# Patient Record
Sex: MALE | Race: WHITE | NOT HISPANIC OR LATINO | Employment: UNEMPLOYED | URBAN - NONMETROPOLITAN AREA
[De-identification: names, ages, dates, MRNs, and addresses within clinical notes are randomized per-mention and may not be internally consistent; named-entity substitution may affect disease eponyms.]

---

## 2024-01-24 ENCOUNTER — HOSPITAL ENCOUNTER (EMERGENCY)
Facility: HOSPITAL | Age: 34
Discharge: STILL ADMITTED OR ADMINISTRATIVE | DRG: 885 | End: 2024-01-24
Attending: STUDENT IN AN ORGANIZED HEALTH CARE EDUCATION/TRAINING PROGRAM
Payer: MEDICAID

## 2024-01-24 ENCOUNTER — HOSPITAL ENCOUNTER (INPATIENT)
Facility: HOSPITAL | Age: 34
LOS: 6 days | Discharge: HOME | DRG: 885 | End: 2024-01-30
Attending: PSYCHIATRY & NEUROLOGY | Admitting: PSYCHIATRY & NEUROLOGY
Payer: MEDICAID

## 2024-01-24 ENCOUNTER — APPOINTMENT (OUTPATIENT)
Dept: GENERAL RADIOLOGY | Facility: HOSPITAL | Age: 34
DRG: 885 | End: 2024-01-24
Payer: MEDICAID

## 2024-01-24 ENCOUNTER — APPOINTMENT (OUTPATIENT)
Dept: CT IMAGING | Facility: HOSPITAL | Age: 34
DRG: 885 | End: 2024-01-24
Payer: MEDICAID

## 2024-01-24 VITALS
SYSTOLIC BLOOD PRESSURE: 105 MMHG | WEIGHT: 210 LBS | DIASTOLIC BLOOD PRESSURE: 65 MMHG | OXYGEN SATURATION: 99 % | BODY MASS INDEX: 27.83 KG/M2 | HEIGHT: 73 IN | TEMPERATURE: 98.4 F | RESPIRATION RATE: 18 BRPM | HEART RATE: 56 BPM

## 2024-01-24 DIAGNOSIS — F29 PSYCHOSIS, UNSPECIFIED PSYCHOSIS TYPE: Primary | ICD-10-CM

## 2024-01-24 LAB
ALBUMIN SERPL-MCNC: 4.7 G/DL (ref 3.5–5.2)
ALBUMIN/GLOB SERPL: 1.3 G/DL
ALP SERPL-CCNC: 89 U/L (ref 39–117)
ALT SERPL W P-5'-P-CCNC: 23 U/L (ref 1–41)
AMPHET+METHAMPHET UR QL: NEGATIVE
AMPHETAMINES UR QL: NEGATIVE
ANION GAP SERPL CALCULATED.3IONS-SCNC: 9.6 MMOL/L (ref 5–15)
AST SERPL-CCNC: 23 U/L (ref 1–40)
BARBITURATES UR QL SCN: NEGATIVE
BASOPHILS # BLD AUTO: 0.05 10*3/MM3 (ref 0–0.2)
BASOPHILS NFR BLD AUTO: 0.5 % (ref 0–1.5)
BENZODIAZ UR QL SCN: NEGATIVE
BILIRUB SERPL-MCNC: 0.5 MG/DL (ref 0–1.2)
BILIRUB UR QL STRIP: NEGATIVE
BUN SERPL-MCNC: 17 MG/DL (ref 6–20)
BUN/CREAT SERPL: 18.7 (ref 7–25)
BUPRENORPHINE SERPL-MCNC: POSITIVE NG/ML
CALCIUM SPEC-SCNC: 9.7 MG/DL (ref 8.6–10.5)
CANNABINOIDS SERPL QL: NEGATIVE
CHLORIDE SERPL-SCNC: 101 MMOL/L (ref 98–107)
CLARITY UR: CLEAR
CO2 SERPL-SCNC: 28.4 MMOL/L (ref 22–29)
COCAINE UR QL: NEGATIVE
COLOR UR: YELLOW
CREAT SERPL-MCNC: 0.91 MG/DL (ref 0.76–1.27)
DEPRECATED RDW RBC AUTO: 44.9 FL (ref 37–54)
EGFRCR SERPLBLD CKD-EPI 2021: 114.8 ML/MIN/1.73
EOSINOPHIL # BLD AUTO: 0.27 10*3/MM3 (ref 0–0.4)
EOSINOPHIL NFR BLD AUTO: 3 % (ref 0.3–6.2)
ERYTHROCYTE [DISTWIDTH] IN BLOOD BY AUTOMATED COUNT: 13.7 % (ref 12.3–15.4)
ETHANOL BLD-MCNC: <10 MG/DL (ref 0–10)
ETHANOL UR QL: <0.01 %
FENTANYL UR-MCNC: NEGATIVE NG/ML
GLOBULIN UR ELPH-MCNC: 3.7 GM/DL
GLUCOSE SERPL-MCNC: 137 MG/DL (ref 65–99)
GLUCOSE UR STRIP-MCNC: NEGATIVE MG/DL
HCT VFR BLD AUTO: 41.4 % (ref 37.5–51)
HGB BLD-MCNC: 13.3 G/DL (ref 13–17.7)
HGB UR QL STRIP.AUTO: NEGATIVE
IMM GRANULOCYTES # BLD AUTO: 0.05 10*3/MM3 (ref 0–0.05)
IMM GRANULOCYTES NFR BLD AUTO: 0.5 % (ref 0–0.5)
KETONES UR QL STRIP: NEGATIVE
LEUKOCYTE ESTERASE UR QL STRIP.AUTO: NEGATIVE
LYMPHOCYTES # BLD AUTO: 2.86 10*3/MM3 (ref 0.7–3.1)
LYMPHOCYTES NFR BLD AUTO: 31.4 % (ref 19.6–45.3)
MAGNESIUM SERPL-MCNC: 2.1 MG/DL (ref 1.6–2.6)
MCH RBC QN AUTO: 28.6 PG (ref 26.6–33)
MCHC RBC AUTO-ENTMCNC: 32.1 G/DL (ref 31.5–35.7)
MCV RBC AUTO: 89 FL (ref 79–97)
METHADONE UR QL SCN: NEGATIVE
MONOCYTES # BLD AUTO: 0.8 10*3/MM3 (ref 0.1–0.9)
MONOCYTES NFR BLD AUTO: 8.8 % (ref 5–12)
NEUTROPHILS NFR BLD AUTO: 5.07 10*3/MM3 (ref 1.7–7)
NEUTROPHILS NFR BLD AUTO: 55.8 % (ref 42.7–76)
NITRITE UR QL STRIP: NEGATIVE
NRBC BLD AUTO-RTO: 0 /100 WBC (ref 0–0.2)
OPIATES UR QL: NEGATIVE
OXYCODONE UR QL SCN: NEGATIVE
PCP UR QL SCN: NEGATIVE
PH UR STRIP.AUTO: 6.5 [PH] (ref 5–8)
PLATELET # BLD AUTO: 220 10*3/MM3 (ref 140–450)
PMV BLD AUTO: 9.6 FL (ref 6–12)
POTASSIUM SERPL-SCNC: 4 MMOL/L (ref 3.5–5.2)
PROT SERPL-MCNC: 8.4 G/DL (ref 6–8.5)
PROT UR QL STRIP: NEGATIVE
RBC # BLD AUTO: 4.65 10*6/MM3 (ref 4.14–5.8)
SODIUM SERPL-SCNC: 139 MMOL/L (ref 136–145)
SP GR UR STRIP: 1.02 (ref 1–1.03)
TRICYCLICS UR QL SCN: NEGATIVE
UROBILINOGEN UR QL STRIP: NORMAL
WBC NRBC COR # BLD AUTO: 9.1 10*3/MM3 (ref 3.4–10.8)

## 2024-01-24 PROCEDURE — 82077 ASSAY SPEC XCP UR&BREATH IA: CPT | Performed by: PHYSICIAN ASSISTANT

## 2024-01-24 PROCEDURE — 81003 URINALYSIS AUTO W/O SCOPE: CPT | Performed by: PHYSICIAN ASSISTANT

## 2024-01-24 PROCEDURE — 99285 EMERGENCY DEPT VISIT HI MDM: CPT

## 2024-01-24 PROCEDURE — 71045 X-RAY EXAM CHEST 1 VIEW: CPT

## 2024-01-24 PROCEDURE — 80307 DRUG TEST PRSMV CHEM ANLYZR: CPT | Performed by: PHYSICIAN ASSISTANT

## 2024-01-24 PROCEDURE — 70450 CT HEAD/BRAIN W/O DYE: CPT

## 2024-01-24 PROCEDURE — 93010 ELECTROCARDIOGRAM REPORT: CPT | Performed by: INTERNAL MEDICINE

## 2024-01-24 PROCEDURE — 85025 COMPLETE CBC W/AUTO DIFF WBC: CPT | Performed by: PHYSICIAN ASSISTANT

## 2024-01-24 PROCEDURE — 71045 X-RAY EXAM CHEST 1 VIEW: CPT | Performed by: RADIOLOGY

## 2024-01-24 PROCEDURE — 70450 CT HEAD/BRAIN W/O DYE: CPT | Performed by: RADIOLOGY

## 2024-01-24 PROCEDURE — 83735 ASSAY OF MAGNESIUM: CPT | Performed by: PHYSICIAN ASSISTANT

## 2024-01-24 PROCEDURE — 36415 COLL VENOUS BLD VENIPUNCTURE: CPT

## 2024-01-24 PROCEDURE — 93005 ELECTROCARDIOGRAM TRACING: CPT | Performed by: PHYSICIAN ASSISTANT

## 2024-01-24 PROCEDURE — 80053 COMPREHEN METABOLIC PANEL: CPT | Performed by: PHYSICIAN ASSISTANT

## 2024-01-24 RX ORDER — BUPRENORPHINE 64 MG/.18ML
64 INJECTION SUBCUTANEOUS
COMMUNITY

## 2024-01-24 RX ORDER — BENZTROPINE MESYLATE 1 MG/1
2 TABLET ORAL ONCE AS NEEDED
Status: DISCONTINUED | OUTPATIENT
Start: 2024-01-24 | End: 2024-01-30 | Stop reason: HOSPADM

## 2024-01-24 RX ORDER — BENZTROPINE MESYLATE 1 MG/ML
1 INJECTION INTRAMUSCULAR; INTRAVENOUS ONCE AS NEEDED
Status: DISCONTINUED | OUTPATIENT
Start: 2024-01-24 | End: 2024-01-30 | Stop reason: HOSPADM

## 2024-01-24 RX ORDER — MIRTAZAPINE 7.5 MG/1
7.5 TABLET, FILM COATED ORAL NIGHTLY
COMMUNITY
End: 2024-01-30 | Stop reason: HOSPADM

## 2024-01-24 RX ORDER — LOPERAMIDE HYDROCHLORIDE 2 MG/1
2 CAPSULE ORAL
Status: DISCONTINUED | OUTPATIENT
Start: 2024-01-24 | End: 2024-01-30 | Stop reason: HOSPADM

## 2024-01-24 RX ORDER — ONDANSETRON 4 MG/1
4 TABLET, ORALLY DISINTEGRATING ORAL EVERY 6 HOURS PRN
Status: DISCONTINUED | OUTPATIENT
Start: 2024-01-24 | End: 2024-01-30 | Stop reason: HOSPADM

## 2024-01-24 RX ORDER — HYDROXYZINE 50 MG/1
50 TABLET, FILM COATED ORAL EVERY 6 HOURS PRN
Status: DISCONTINUED | OUTPATIENT
Start: 2024-01-24 | End: 2024-01-25

## 2024-01-24 RX ORDER — AMOXICILLIN 500 MG/1
1000 CAPSULE ORAL 3 TIMES DAILY
Status: ON HOLD | COMMUNITY
End: 2024-01-25

## 2024-01-24 RX ORDER — ACETAMINOPHEN 325 MG/1
650 TABLET ORAL EVERY 6 HOURS PRN
Status: ON HOLD | COMMUNITY
End: 2024-01-25

## 2024-01-24 RX ORDER — BUPRENORPHINE HYDROCHLORIDE AND NALOXONE HYDROCHLORIDE DIHYDRATE 8; 2 MG/1; MG/1
1 TABLET SUBLINGUAL DAILY
COMMUNITY
End: 2024-01-30 | Stop reason: HOSPADM

## 2024-01-24 RX ORDER — ONDANSETRON 4 MG/1
4 TABLET, ORALLY DISINTEGRATING ORAL EVERY 8 HOURS PRN
COMMUNITY
End: 2024-01-30 | Stop reason: HOSPADM

## 2024-01-24 RX ORDER — ALUMINA, MAGNESIA, AND SIMETHICONE 2400; 2400; 240 MG/30ML; MG/30ML; MG/30ML
15 SUSPENSION ORAL EVERY 6 HOURS PRN
Status: DISCONTINUED | OUTPATIENT
Start: 2024-01-24 | End: 2024-01-30 | Stop reason: HOSPADM

## 2024-01-24 RX ORDER — IBUPROFEN 400 MG/1
400 TABLET ORAL EVERY 6 HOURS PRN
Status: DISCONTINUED | OUTPATIENT
Start: 2024-01-24 | End: 2024-01-30 | Stop reason: HOSPADM

## 2024-01-24 RX ORDER — NICOTINE 21 MG/24HR
1 PATCH, TRANSDERMAL 24 HOURS TRANSDERMAL
Status: DISCONTINUED | OUTPATIENT
Start: 2024-01-25 | End: 2024-01-30 | Stop reason: HOSPADM

## 2024-01-24 RX ORDER — ACETAMINOPHEN 325 MG/1
650 TABLET ORAL EVERY 6 HOURS PRN
Status: DISCONTINUED | OUTPATIENT
Start: 2024-01-24 | End: 2024-01-30 | Stop reason: HOSPADM

## 2024-01-24 RX ORDER — ECHINACEA PURPUREA EXTRACT 125 MG
2 TABLET ORAL AS NEEDED
Status: DISCONTINUED | OUTPATIENT
Start: 2024-01-24 | End: 2024-01-30 | Stop reason: HOSPADM

## 2024-01-24 RX ORDER — METOPROLOL TARTRATE 50 MG/1
50 TABLET, FILM COATED ORAL 2 TIMES DAILY
COMMUNITY
End: 2024-01-30 | Stop reason: HOSPADM

## 2024-01-24 RX ORDER — TRAZODONE HYDROCHLORIDE 50 MG/1
50 TABLET ORAL NIGHTLY PRN
Status: DISCONTINUED | OUTPATIENT
Start: 2024-01-24 | End: 2024-01-30 | Stop reason: HOSPADM

## 2024-01-24 RX ORDER — PALIPERIDONE PALMITATE 117 MG/.75ML
117 INJECTION INTRAMUSCULAR
COMMUNITY

## 2024-01-24 RX ORDER — BENZONATATE 100 MG/1
100 CAPSULE ORAL 3 TIMES DAILY PRN
Status: DISCONTINUED | OUTPATIENT
Start: 2024-01-24 | End: 2024-01-30 | Stop reason: HOSPADM

## 2024-01-24 RX ORDER — FAMOTIDINE 20 MG/1
20 TABLET, FILM COATED ORAL 2 TIMES DAILY PRN
Status: DISCONTINUED | OUTPATIENT
Start: 2024-01-24 | End: 2024-01-30 | Stop reason: HOSPADM

## 2024-01-25 PROBLEM — F29 PSYCHOSIS: Status: ACTIVE | Noted: 2024-01-25

## 2024-01-25 PROBLEM — F19.10 POLYSUBSTANCE ABUSE: Status: ACTIVE | Noted: 2024-01-25

## 2024-01-25 PROBLEM — F29 PSYCHOSIS: Status: RESOLVED | Noted: 2024-01-25 | Resolved: 2024-01-25

## 2024-01-25 PROBLEM — F11.20 OPIOID DEPENDENCE ON AGONIST THERAPY: Status: ACTIVE | Noted: 2024-01-25

## 2024-01-25 LAB
HAV IGM SERPL QL IA: ABNORMAL
HBV CORE IGM SERPL QL IA: ABNORMAL
HBV SURFACE AG SERPL QL IA: ABNORMAL
HCV AB SER DONR QL: REACTIVE
QT INTERVAL: 418 MS
QT INTERVAL: 452 MS
QTC INTERVAL: 428 MS
QTC INTERVAL: 441 MS

## 2024-01-25 PROCEDURE — 93005 ELECTROCARDIOGRAM TRACING: CPT | Performed by: PSYCHIATRY & NEUROLOGY

## 2024-01-25 PROCEDURE — 99223 1ST HOSP IP/OBS HIGH 75: CPT | Performed by: PSYCHIATRY & NEUROLOGY

## 2024-01-25 PROCEDURE — 93010 ELECTROCARDIOGRAM REPORT: CPT | Performed by: INTERNAL MEDICINE

## 2024-01-25 PROCEDURE — 80074 ACUTE HEPATITIS PANEL: CPT | Performed by: PSYCHIATRY & NEUROLOGY

## 2024-01-25 RX ORDER — OLANZAPINE 5 MG/1
5 TABLET ORAL 2 TIMES DAILY
Status: DISCONTINUED | OUTPATIENT
Start: 2024-01-25 | End: 2024-01-30 | Stop reason: HOSPADM

## 2024-01-25 RX ORDER — BUPRENORPHINE HYDROCHLORIDE AND NALOXONE HYDROCHLORIDE DIHYDRATE 8; 2 MG/1; MG/1
1 TABLET SUBLINGUAL DAILY
Status: CANCELLED | OUTPATIENT
Start: 2024-01-25

## 2024-01-25 RX ORDER — ONDANSETRON 4 MG/1
4 TABLET, ORALLY DISINTEGRATING ORAL EVERY 8 HOURS PRN
Status: DISCONTINUED | OUTPATIENT
Start: 2024-01-25 | End: 2024-01-30 | Stop reason: HOSPADM

## 2024-01-25 RX ORDER — OLANZAPINE 5 MG/1
5 TABLET, ORALLY DISINTEGRATING ORAL 3 TIMES DAILY PRN
Status: DISCONTINUED | OUTPATIENT
Start: 2024-01-25 | End: 2024-01-30 | Stop reason: HOSPADM

## 2024-01-25 RX ORDER — MIRTAZAPINE 15 MG/1
7.5 TABLET, FILM COATED ORAL NIGHTLY
Status: DISCONTINUED | OUTPATIENT
Start: 2024-01-25 | End: 2024-01-25

## 2024-01-25 RX ADMIN — NICOTINE TRANSDERMAL SYSTEM 1 PATCH: 21 PATCH, EXTENDED RELEASE TRANSDERMAL at 08:17

## 2024-01-25 RX ADMIN — OLANZAPINE 5 MG: 5 TABLET, FILM COATED ORAL at 20:55

## 2024-01-25 RX ADMIN — TRAZODONE HYDROCHLORIDE 50 MG: 50 TABLET ORAL at 20:55

## 2024-01-25 RX ADMIN — METOPROLOL TARTRATE 50 MG: 25 TABLET, FILM COATED ORAL at 08:17

## 2024-01-25 RX ADMIN — OLANZAPINE 5 MG: 5 TABLET, FILM COATED ORAL at 11:54

## 2024-01-25 RX ADMIN — METOPROLOL TARTRATE 50 MG: 25 TABLET, FILM COATED ORAL at 01:33

## 2024-01-25 RX ADMIN — MIRTAZAPINE 7.5 MG: 15 TABLET, FILM COATED ORAL at 01:33

## 2024-01-25 RX ADMIN — METOPROLOL TARTRATE 50 MG: 25 TABLET, FILM COATED ORAL at 20:55

## 2024-01-25 NOTE — PLAN OF CARE
"Goal Outcome Evaluation:  Plan of Care Reviewed With: patient  Patient Agreement with Plan of Care: agrees     Progress: no change  Outcome Evaluation: Patient states, He was brought in by \"[facility]\" for psych eval. Patient reports he has been living there for \"two weeks\". Patient poor historion and has odd behavior and has odd response to assessment questions.Difficultly obtaining history. Patient disoriented to place and time. This RN, ask patient if he uses drugs or alcohol and he states, Meth a little bit here and there, alcohol daily budlight, wine, and liquor.\" Unable to obtain how much alcohol or how long he has been drinking. Patient denies SI/Hi/AVH. Patient states, \"not yet\" when asked if he was having AVH he then waves his fingers in the air and states, \"like this.\"   COWS 0. CIWA 1. Denies anxiety and depression. Patient reports good sleep. Patient reports good appetite. UDS positive for Buprenorphine.                               "

## 2024-01-25 NOTE — ED PROVIDER NOTES
"Subjective   History of Present Illness  This is a 33 year old male patient who presents to the ER with chief complaint of altered mental status. Patient states the police brought him in because he hs been altered at home. He says he is \"lucifer, God, and Robb Sean.\" Denies SI, HI, drug abuse and alcohol abuse.       History provided by:  Patient  History limited by:  Psychiatric disorder   used: No    Mental Health Problem  Presenting symptoms: agitation, bizarre behavior, delusional, disorganized speech, disorganized thought process and hallucinations    Presenting symptoms: no self-mutilation and no suicidal thoughts    Patient accompanied by:  Law enforcement  Degree of incapacity (severity):  Moderate  Onset quality:  Unable to specify  Timing:  Unable to specify  Progression:  Unable to specify  Treatment compliance:  Unable to specify  Relieved by:  None tried  Ineffective treatments:  None tried  Associated symptoms: distractible, irritability and poor judgment    Associated symptoms: no abdominal pain, no anxiety and no chest pain    Risk factors: hx of mental illness        Review of Systems   Constitutional:  Positive for irritability. Negative for fever.   HENT: Negative.     Respiratory: Negative.     Cardiovascular: Negative.  Negative for chest pain.   Gastrointestinal: Negative.  Negative for abdominal pain.   Endocrine: Negative.    Genitourinary: Negative.  Negative for dysuria.   Skin: Negative.    Neurological: Negative.    Psychiatric/Behavioral:  Positive for agitation and hallucinations. Negative for behavioral problems, confusion, decreased concentration, dysphoric mood, self-injury, sleep disturbance and suicidal ideas. The patient is not nervous/anxious and is not hyperactive.    All other systems reviewed and are negative.      Past Medical History:   Diagnosis Date    Asthma     Substance abuse        No Known Allergies    History reviewed. No pertinent surgical " "history.    History reviewed. No pertinent family history.    Social History     Socioeconomic History    Marital status:      Spouse name: Antonia Soto    Years of education: 12th   Substance and Sexual Activity    Alcohol use: Not Currently     Comment: \"been a few weeks\"    Drug use: Yes     Types: Cocaine(coke), Methamphetamines     Comment: alcohol, bath salts, suboxone           Objective   Physical Exam  Vitals and nursing note reviewed.   Constitutional:       General: He is not in acute distress.     Appearance: He is well-developed. He is not diaphoretic.   HENT:      Head: Normocephalic and atraumatic.      Right Ear: External ear normal.      Left Ear: External ear normal.      Nose: Nose normal.   Eyes:      Conjunctiva/sclera: Conjunctivae normal.      Pupils: Pupils are equal, round, and reactive to light.   Neck:      Vascular: No JVD.      Trachea: No tracheal deviation.   Cardiovascular:      Rate and Rhythm: Normal rate and regular rhythm.      Heart sounds: Normal heart sounds. No murmur heard.  Pulmonary:      Effort: Pulmonary effort is normal. No respiratory distress.      Breath sounds: Normal breath sounds. No wheezing.   Abdominal:      General: Bowel sounds are normal.      Palpations: Abdomen is soft.      Tenderness: There is no abdominal tenderness.   Musculoskeletal:         General: No deformity. Normal range of motion.      Cervical back: Normal range of motion and neck supple.   Skin:     General: Skin is warm and dry.      Coloration: Skin is not pale.      Findings: No erythema or rash.   Neurological:      Mental Status: He is alert and oriented to person, place, and time.      Cranial Nerves: No cranial nerve deficit.   Psychiatric:         Behavior: Behavior normal.         Thought Content: Thought content normal.         Judgment: Judgment normal.         Procedures       Results for orders placed or performed during the hospital encounter of 01/24/24   Magnesium    " Specimen: Arm, Left; Blood   Result Value Ref Range    Magnesium 2.1 1.6 - 2.6 mg/dL   Comprehensive Metabolic Panel    Specimen: Arm, Left; Blood   Result Value Ref Range    Glucose 137 (H) 65 - 99 mg/dL    BUN 17 6 - 20 mg/dL    Creatinine 0.91 0.76 - 1.27 mg/dL    Sodium 139 136 - 145 mmol/L    Potassium 4.0 3.5 - 5.2 mmol/L    Chloride 101 98 - 107 mmol/L    CO2 28.4 22.0 - 29.0 mmol/L    Calcium 9.7 8.6 - 10.5 mg/dL    Total Protein 8.4 6.0 - 8.5 g/dL    Albumin 4.7 3.5 - 5.2 g/dL    ALT (SGPT) 23 1 - 41 U/L    AST (SGOT) 23 1 - 40 U/L    Alkaline Phosphatase 89 39 - 117 U/L    Total Bilirubin 0.5 0.0 - 1.2 mg/dL    Globulin 3.7 gm/dL    A/G Ratio 1.3 g/dL    BUN/Creatinine Ratio 18.7 7.0 - 25.0    Anion Gap 9.6 5.0 - 15.0 mmol/L    eGFR 114.8 >60.0 mL/min/1.73   Ethanol    Specimen: Arm, Left; Blood   Result Value Ref Range    Ethanol <10 0 - 10 mg/dL    Ethanol % <0.010 %   Urine Drug Screen - Urine, Clean Catch    Specimen: Urine, Clean Catch   Result Value Ref Range    THC, Screen, Urine Negative Negative    Phencyclidine (PCP), Urine Negative Negative    Cocaine Screen, Urine Negative Negative    Methamphetamine, Ur Negative Negative    Opiate Screen Negative Negative    Amphetamine Screen, Urine Negative Negative    Benzodiazepine Screen, Urine Negative Negative    Tricyclic Antidepressants Screen Negative Negative    Methadone Screen, Urine Negative Negative    Barbiturates Screen, Urine Negative Negative    Oxycodone Screen, Urine Negative Negative    Buprenorphine, Screen, Urine Positive (A) Negative   Urinalysis With Culture If Indicated - Urine, Clean Catch    Specimen: Urine, Clean Catch   Result Value Ref Range    Color, UA Yellow Yellow, Straw    Appearance, UA Clear Clear    pH, UA 6.5 5.0 - 8.0    Specific Gravity, UA 1.023 1.005 - 1.030    Glucose, UA Negative Negative    Ketones, UA Negative Negative    Bilirubin, UA Negative Negative    Blood, UA Negative Negative    Protein, UA Negative  Negative    Leuk Esterase, UA Negative Negative    Nitrite, UA Negative Negative    Urobilinogen, UA 1.0 E.U./dL 0.2 - 1.0 E.U./dL   CBC Auto Differential    Specimen: Arm, Left; Blood   Result Value Ref Range    WBC 9.10 3.40 - 10.80 10*3/mm3    RBC 4.65 4.14 - 5.80 10*6/mm3    Hemoglobin 13.3 13.0 - 17.7 g/dL    Hematocrit 41.4 37.5 - 51.0 %    MCV 89.0 79.0 - 97.0 fL    MCH 28.6 26.6 - 33.0 pg    MCHC 32.1 31.5 - 35.7 g/dL    RDW 13.7 12.3 - 15.4 %    RDW-SD 44.9 37.0 - 54.0 fl    MPV 9.6 6.0 - 12.0 fL    Platelets 220 140 - 450 10*3/mm3    Neutrophil % 55.8 42.7 - 76.0 %    Lymphocyte % 31.4 19.6 - 45.3 %    Monocyte % 8.8 5.0 - 12.0 %    Eosinophil % 3.0 0.3 - 6.2 %    Basophil % 0.5 0.0 - 1.5 %    Immature Grans % 0.5 0.0 - 0.5 %    Neutrophils, Absolute 5.07 1.70 - 7.00 10*3/mm3    Lymphocytes, Absolute 2.86 0.70 - 3.10 10*3/mm3    Monocytes, Absolute 0.80 0.10 - 0.90 10*3/mm3    Eosinophils, Absolute 0.27 0.00 - 0.40 10*3/mm3    Basophils, Absolute 0.05 0.00 - 0.20 10*3/mm3    Immature Grans, Absolute 0.05 0.00 - 0.05 10*3/mm3    nRBC 0.0 0.0 - 0.2 /100 WBC   Fentanyl, Urine - Urine, Clean Catch    Specimen: Urine, Clean Catch   Result Value Ref Range    Fentanyl, Urine Negative Negative   ECG 12 Lead Chest Pain   Result Value Ref Range    QT Interval 452 ms    QTC Interval 428 ms          ED Course  ED Course as of 01/24/24 2342 Wed Jan 24, 2024 2235 CT Head Without Contrast  IMPRESSION:    No acute intracranial abnormality.         This report was finalized on 1/24/2024 10:20 PM by Cristal Rachel MD   [MM]   1407 XR Chest 1 View     IMPRESSION:     No evidence of acute disease in the chest.     This report was finalized on 1/24/2024 10:18 PM by Cristal Rachel MD   [MM]   6745 EKG obtained and independently interpreted as normal sinus rhythm without acute ischemic findings or arrhythmia. [AS]   2341 Patient has been accepted to [facility].  [MM]      ED Course User Index  [AS] Leeroy Burton,  "MD  [MM] Allyson Cai PA                                             Medical Decision Making    This is a 33 year old male patient who presents to the ER with chief complaint of altered mental status. Patient states the police brought him in because he hs been altered at home. He says he is \"lucifer, God, and Robb Sean.\" Denies SI, HI, drug abuse and alcohol abuse.             Amount and/or Complexity of Data Reviewed  Labs: ordered. Decision-making details documented in ED Course.  Radiology: ordered. Decision-making details documented in ED Course.  ECG/medicine tests: ordered. Decision-making details documented in ED Course.        Final diagnoses:   Psychosis, unspecified psychosis type       ED Disposition  ED Disposition       ED Disposition   DC/Transfer to Behavioral Health    Condition   Stable    Comment   --               No follow-up provider specified.       Medication List      No changes were made to your prescriptions during this visit.            Allyson Cai PA  01/24/24 9511    "

## 2024-01-25 NOTE — NURSING NOTE
Urine label was not sent with specimen. Spoke with  and she advised to collect in system and send label to lab.     Awaiting results.

## 2024-01-25 NOTE — PLAN OF CARE
Goal Outcome Evaluation:  Plan of Care Reviewed With: patient  Patient Agreement with Plan of Care: agrees     Progress: improving          Patient rates anxiety 0 and depression 0, denies thoughts of harming self and others, and denies hallucinations.

## 2024-01-25 NOTE — NURSING NOTE
Spoke with Dr. MEME Bragg, presented clinicals. MD requested, CT Head, Cxray, and EKG due to patient's psychosis and inability to establish history. If results return as normal, admit pt SP3. RBTO x 2. Notified ED Provider, ROCKY Villalobos, and lead, FABRICE Pugh.

## 2024-01-25 NOTE — PROGRESS NOTES
Called [facility] office in [place] to confirm last dates of injections. Per Krista at office, Invega 117 was administered on 1/5/2024 and Brixadi 64 mg was administered on 1/12/2024. Both injections are due once monthly.    Thank you,    Lina Pierson, PharmD  01/25/24  14:33 EST
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
## 2024-01-25 NOTE — PLAN OF CARE
Problem: Adult Behavioral Health Plan of Care  Goal: Plan of Care Review  Flowsheets (Taken 1/25/2024 1203)  Consent Given to Review Plan with: NA  Progress: no change  Plan of Care Reviewed With: patient  Patient Agreement with Plan of Care: agrees  Outcome Evaluation: New admit. Completed social history and integrated summary  Goal: Patient-Specific Goal (Individualization)  Outcome: Ongoing, Progressing  Flowsheets  Taken 1/25/2024 1203  Patient-Specific Goals (Include Timeframe): Patient will deny SI/HI prior to discharge. Patient will identify 1 healthy coping skill for depression prior to discharge.  Patient will consent to appropriate aftercare plan prior to discharge.  Individualized Care Needs: Therapist will offer 1-4 individual sessions, family education, aftercare planning  Anxieties, Fears or Concerns: none reported  Taken 1/25/2024 1152  Patient Personal Strengths:   resilient   resourceful  Patient Vulnerabilities:   substance abuse/addiction   limited social skills   history of unsuccessful treatment   housing insecurity   lacks insight into illness   poor impulse control   occupational insecurity   limited support system  Goal: Optimized Coping Skills in Response to Life Stressors  Outcome: Ongoing, Progressing  Intervention: Promote Effective Coping Strategies  Flowsheets (Taken 1/25/2024 1203)  Supportive Measures:   counseling provided   other (see comments)  Goal: Develops/Participates in Therapeutic [place] to Support Successful Transition  Outcome: Ongoing, Progressing  Intervention: Foster Therapeutic [place]  Flowsheets (Taken 1/25/2024 1203)  Trust Relationship/Rapport: reassurance provided  Intervention: Mutually Develop Transition Plan  Flowsheets  Taken 1/25/2024 1203  Outpatient/Agency/Support Group Needs: residential services  Transition Support:   community resources reviewed   follow-up care discussed   crisis management plan promoted   crisis management plan verbalized    follow-up care coordinated  Concerns Comments: NA  Anticipated Discharge Disposition: residential substance use unit  Taken 1/25/2024 1201  Discharge Coordination/Progress: Patient has [place] Medicaid for discharge planning and aftercare is unresolved. Recommend residential or sober living referral  Transportation Anticipated: agency  Transportation Concerns: no car  Current Discharge Risk:   substance use/abuse   psychiatric illness  Concerns to be Addressed:   substance/tobacco abuse/use   cognitive/perceptual   medication   mental health   discharge planning   home safety  Readmission Within the Last 30 Days: no previous admission in last 30 days  Patient/Family Anticipated Services at Transition:   outpatient care   rehabilitation services  Patient's Choice of Community Agency(s): To be determined  Patient/Family Anticipates Transition to: inpatient rehabilitation facility  Offered/Gave Vendor List: no   Goal Outcome Evaluation:  Plan of Care Reviewed With: patient  Patient Agreement with Plan of Care: agrees  Consent Given to Review Plan with: NA  Progress: no change  Outcome Evaluation: New admit. Completed social history and integrated summary

## 2024-01-25 NOTE — PROGRESS NOTES
"1138:    DATA:      Therapist met individually with patient this date to introduce role and to discuss hospitalization expectations. Patient unable to participate.  Reviewed medical record and staffed case with treatment team this date. No major issues identified.       Clinical Maneuvering/Intervention:        Therapist completed integrated summary, treatment plan, and initiated social history this date.  Therapist is strongly encouraging family involvement in treatment.       ASSESSMENT:      The patient is a 33 year old  male. Per report, \"34 y/o presents from, \"[facility]\" for psych eval. Pt reports he has been living there for \"two weeks\". Pt noted to have bizarre behavior while waiting in intake. Pacing and staring frequently. Difficultly obtaining history. Pt reports he is \"in [place]\". Reports date as \"2014\" - unable to provide date or month. Pt verbalizes bizarre responses to questions at times.  Unable to establish current alcohol or substance use. In response to alcohol use - pt reports, \"I drink whenever I can get it. I drank a red bull this morning. Isn't that alcohol\". Verbalizes that he uses all kinds of drugs. Unable to establish history as to frequency and amounts. Pt denies SI/HI. In response to hallucinations, \"not yet\" and then motions to chair beside him.\"     Today, patient was seen 1-1 in the day room. Patient appeared to be standing up eating his lunch.  Patient did not respond to questions, but exhaled and returned to eating.     Chart indicates that patient was referred by [facility] in [facility]. It states that he has been living there 2 weeks. Therapist staffed with Dr. Bragg.  Due to patient's psychosis, will override consent and call [facility] to gain collateral.  Contacted staff there at [phone]; [phone]; 2-3 days out of the week he will be rational, but majority of time he appears psychotic.  He reports that patient was there for a few weeks, " left and returned and he has been there two weeks.      Treatment team has met about patient and they can not accept him back to [facility] due to his mental status.  They report that he will need to find another placement.     Jess lam is listed as mother, however staff at [facility] did not have a contact.         PLAN:       Patient to remain hospitalized this date.      Treatment team will focus efforts on stabilizing patient's acute symptoms while providing education on healthy coping and crisis management to reduce hospitalizations.   Patient requires daily psychiatrist evaluation and 24/7 nursing supervision to promote patient  safety.     Therapist will offer 1-4 individual sessions, family education, and appropriate referral.     Therapist recommends continued assessment.  Aftercare consents unresolved.  [facility] is declining to accept patient back at discharge.  Patient will likely need to work on an alternative residential placement and/or involve family.

## 2024-01-25 NOTE — NURSING NOTE
"34 y/o presents from, \"[facility]\" for psych eval. Pt reports he has been living there for \"two weeks\".     Pt noted to have bizarre behavior while waiting in intake. Pacing and staring frequently. Difficultly obtaining history. Pt reports he is \"in [place]\". Reports date as \"2014\" - unable to provide date or month.     Pt verbalizes bizarre responses to questions at times.      Unable to establish current alcohol or substance use. In response to alcohol use - pt reports, \"I drink whenever I can get it. I drank a red bull this morning. Isn't that alcohol\". Verbalizes that he uses all kinds of drugs. Unable to establish history as to frequency and amounts.     Pt denies SI/HI. In response to hallucinations, \"not yet\" and then motions to chair beside him and states, \"you mean like that right there\".     COWS 2  CIWA 2    Depression 8/10  Anxiety 8/10    Denies issues with appetite or sleep.     Glucose 137    UDS  Buprenorphine              "

## 2024-01-26 PROCEDURE — 99232 SBSQ HOSP IP/OBS MODERATE 35: CPT | Performed by: PSYCHIATRY & NEUROLOGY

## 2024-01-26 RX ADMIN — OLANZAPINE 5 MG: 5 TABLET, FILM COATED ORAL at 20:36

## 2024-01-26 RX ADMIN — METOPROLOL TARTRATE 50 MG: 25 TABLET, FILM COATED ORAL at 08:26

## 2024-01-26 RX ADMIN — NICOTINE TRANSDERMAL SYSTEM 1 PATCH: 21 PATCH, EXTENDED RELEASE TRANSDERMAL at 08:25

## 2024-01-26 RX ADMIN — METOPROLOL TARTRATE 50 MG: 25 TABLET, FILM COATED ORAL at 20:36

## 2024-01-26 RX ADMIN — TRAZODONE HYDROCHLORIDE 50 MG: 50 TABLET ORAL at 20:36

## 2024-01-26 RX ADMIN — OLANZAPINE 5 MG: 5 TABLET, FILM COATED ORAL at 08:26

## 2024-01-26 NOTE — PLAN OF CARE
Goal Outcome Evaluation:  Plan of Care Reviewed With: patient  Patient Agreement with Plan of Care: agrees     Progress: improving  Outcome Evaluation: PT. RATES ANXIETY /DEPRESSION 5 HE VERBALZIES SLEPT 6 HOURS GUARDED VAGUE WITH GIVING ASSESSMENT QUESTIONS SMILING TO SELF @ TIMES AMBULATING ABOUT UNIT INTERACTION NOTED WITH PEERS .

## 2024-01-26 NOTE — PLAN OF CARE
Problem: Adult Behavioral Health Plan of Care  Goal: Patient-Specific Goal (Individualization)  Outcome: Ongoing, Progressing  Flowsheets  Taken 1/25/2024 1203 by Albania Holley  Patient-Specific Goals (Include Timeframe): Patient will deny SI/HI prior to discharge. Patient will identify 1 healthy coping skill for depression prior to discharge.  Patient will consent to appropriate aftercare plan prior to discharge.  Individualized Care Needs: Therapist will offer 1-4 individual sessions, family education, aftercare planning  Anxieties, Fears or Concerns: none reported  Taken 1/25/2024 1152 by Albania Holley  Patient Personal Strengths:   resilient   resourceful  Patient Vulnerabilities:   substance abuse/addiction   limited social skills   history of unsuccessful treatment   housing insecurity   lacks insight into illness   poor impulse control   occupational insecurity   limited support system  Goal: Optimized Coping Skills in Response to Life Stressors  Outcome: Ongoing, Progressing  Flowsheets (Taken 1/26/2024 1639)  Optimized Coping Skills in Response to Life Stressors: making progress toward outcome  Intervention: Promote Effective Coping Strategies  Flowsheets (Taken 1/26/2024 1639)  Supportive Measures:   active listening utilized   positive reinforcement provided   self-responsibility promoted   counseling provided   problem-solving facilitated   verbalization of feelings encouraged   decision-making supported   goal-setting facilitated   self-care encouraged   self-reflection promoted  Goal: Develops/Participates in Therapeutic [place] to Support Successful Transition  Outcome: Ongoing, Progressing  Flowsheets (Taken 1/26/2024 1639)  Develops/Participates in Therapeutic [place] to Support Successful Transition: making progress toward outcome  Intervention: Foster Therapeutic [place]  Flowsheets (Taken 1/26/2024 1639)  Trust Relationship/Rapport:   care explained   reassurance  "provided   choices provided   thoughts/feelings acknowledged   emotional support provided   empathic listening provided   questions answered   questions encouraged  Intervention: Mutually Develop Transition Plan  Flowsheets  Taken 1/26/2024 1639 by Deborah Cook LCSW  Transition Support:   community resources reviewed   crisis management plan promoted   follow-up care discussed  Taken 1/25/2024 1201 by Albania Holley  Readmission Within the Last 30 Days: no previous admission in last 30 days  Data:  Therapist reviewed Dr. Bragg's assessment, discussed patient with nursing staff and met with patient this date to further discuss patient progress, review healthy coping and safe disposition.      Clinical Maneuvering/Intervention:    Therapist assisted patient in processing above session content; acknowledged and normalized patient's thoughts, feelings and concerns.  Encouraged patient to discuss/vent feelings, frustrations, and fears concerning their ongoing issues and validated patients feelings.  Discussed the importance of healthy coping and reviewed healthy coping skills such as distraction, thought reframing/redirecting, grounding, mindfulness, etc.  Reviewed safe disposition with patient.    Assessment:  Patient presents bizarre today and said to this therapist \"hello, I haven't seen you in a while\" and this therapist has not previously met the patient.    He further reported he has been \"out here fighting crime.\"   Patient reports he is missing his wife and his best friend.  He reports coping skills of taking a shower, sleeping a few hours and the \"[employer]'s struggle.\"      Plan:  Patient will continue hospitalization/medication management. Efforts will continue to engage in safe disposition planning upon patients further stabilization.                                             "

## 2024-01-26 NOTE — PLAN OF CARE
"Goal Outcome Evaluation:  Plan of Care Reviewed With: patient  Patient Agreement with Plan of Care: agrees     Progress: improving  Outcome Evaluation: Pt calm and cooperative. Pt answered all assessment questions. Pt states, \"id like to go home.\" Pt denies SI,HI,AVH,pain and depression. Pt rated anxiety 6.Pt reports good sleep and appetite. Pt voiced no complaints                               "

## 2024-01-26 NOTE — PROGRESS NOTES
"INPATIENT PSYCHIATRIC PROGRESS NOTE    Name:  Kenrick Soto  :  1990  MRN:  [id number]  Visit Number:  [id number]  Length of stay:  2    Behavioral Health Treatment Plan and Problem List: I have reviewed and approved the Behavioral Health Treatment Plan and Problem list.    SUBJECTIVE    CC/ Focus of exam:  psychosis/ substance abuse.     Patient's subjective status: \"Doing ok\"    INTERVAL HISTORY:     Slept well, presents this morning smiling at times inappropriately.  Frequently contradicts himself regarding his history and sequence of events.  At times he seems bizarrely evasive.  Alert and is oriented to place and person but certainly not to time saying it is summer.  Denies active auditory hallucinations and unable to demonstrate any active persecutory delusions.   Patient concerned about not missing a court date on  although he is unable give specifics about what this entails.  Requesting access to his cell phone so as to give us phone numbers for his mother and sister, would certainly be helpful to have additional information regarding his history and current situation.    Suspect a extended history of mental illness such a Schizophrenia mixed with substance abuse but need more info to confirm.     Contacted Jody Villagran ([phone]) - his treating provider      Depression rating 0/10  Anxiety rating 0/10  Hopelessness: 0/10  Sleep:slept well    Cravin/10       ROS: No cardiovascular, GI, or Neurological complaints.       OBJECTIVE    Vitals:    24 0759   BP: 140/85   Pulse: 59   Resp: 16   Temp: 97.1 °F (36.2 °C)   SpO2: 98%      Temp:  [97.1 °F (36.2 °C)-98.6 °F (37 °C)] 97.1 °F (36.2 °C)  Heart Rate:  [59-73] 59  Resp:  [16-18] 16  BP: (108-140)/(62-85) 140/85    MENTAL STATUS EXAM:       Psychomotor: No psychomotor agitation/retardation, No EPS, No motor tics  Speech-normal rate, amount.  Mood/Affect: Inappropriate  Thought Processes: loose associations  Thought Content: " distorted  Hallucination(s): none  Hopelessness: No  Optimistic: minimally  Suicidal Thoughts: No  Suicidal Plan/Intent: No  Homicidal Thoughts: absent  Orientation: Place and Person  Memory:  Patient either has intermediate and long-term memory deficits or distorted memories due to delusional distractions.  Answers questions with vague conflicting statements.  However, does not appear to be confused.      Lab Results (last 24 hours)       ** No results found for the last 24 hours. **             Imaging Results (Last 24 Hours)       ** No results found for the last 24 hours. **             Lab and x-ray studies reviewed.    ECG/EMG Results (most recent)       Procedure Component Value Units Date/Time    ECG 12 Lead Other; Baseline Cardiac Status [[id number]] Collected: 01/25/24 0230     Updated: 01/25/24 1117     QT Interval 418 ms      QTC Interval 441 ms     Narrative:      Test Reason : Other~  Blood Pressure :   */*   mmHG  Vent. Rate :  67 BPM     Atrial Rate :  67 BPM     P-R Int : 140 ms          QRS Dur :  84 ms      QT Int : 418 ms       P-R-T Axes :  67  59  43 degrees     QTc Int : 441 ms    Normal sinus rhythm  Normal ECG  When compared with ECG of 24-JAN-2024 23:19, (Unconfirmed)  No significant change was found  Confirmed by Jose Armando Meneses ([id number]) on 1/25/2024 11:14:48 AM    Referred By: LAMONT           Confirmed By: Jose Armando Meneses             ALLERGIES: Patient has no known allergies.    Medications:     Invega Sustenna 117 1/5/2024   Brixadi 64 mg  1/12/2024.     metoprolol tartrate, 50 mg, Oral, BID  nicotine, 1 patch, Transdermal, Q24H  OLANZapine, 5 mg, Oral, BID       ASSESSMENT & PLAN      Psychosis  Uncertain of the duration or what component might be related to substance abuse, for now we will treat with Zyprexa and provide for supportive psychotherapeutic effort individual group.  Certainly could utilize additional historical information.       Polysubstance abuse  Uncertain of the details of  his past substance abuse history.          Opioid dependence on agonist therapy  Apparently had been started on Suboxone at the [facility] residential program in [facility] for the 2 weeks PTA.  Has  received IM Brixadi in the past, pharmacy to determine when.       Special precautions: Special Precautions Level 3 (q15 min checks)     Behavioral Health Treatment Plan and Problem List: I have reviewed and approved the Behavioral Health Treatment Plan and Problem list.    I spent a total of 26 minutes in direct patient care including  17 minutes face to face with the patient assessment, coordination of care, and counseling the patient on the current and follow-up treatment plans regarding his status and situation.  Patient had no additional questions.     NGOC Bragg MD    Clinician:  Jesse Bragg MD  01/26/24  08:08 EST    Dictated utilizing Dragon dictation

## 2024-01-27 PROCEDURE — 99232 SBSQ HOSP IP/OBS MODERATE 35: CPT | Performed by: PSYCHIATRY & NEUROLOGY

## 2024-01-27 RX ADMIN — METOPROLOL TARTRATE 50 MG: 25 TABLET, FILM COATED ORAL at 08:32

## 2024-01-27 RX ADMIN — OLANZAPINE 5 MG: 5 TABLET, FILM COATED ORAL at 08:32

## 2024-01-27 RX ADMIN — TRAZODONE HYDROCHLORIDE 50 MG: 50 TABLET ORAL at 20:47

## 2024-01-27 RX ADMIN — METOPROLOL TARTRATE 50 MG: 25 TABLET, FILM COATED ORAL at 20:47

## 2024-01-27 RX ADMIN — OLANZAPINE 5 MG: 5 TABLET, FILM COATED ORAL at 20:47

## 2024-01-27 RX ADMIN — NICOTINE TRANSDERMAL SYSTEM 1 PATCH: 21 PATCH, EXTENDED RELEASE TRANSDERMAL at 08:32

## 2024-01-27 RX ADMIN — ACETAMINOPHEN 650 MG: 325 TABLET ORAL at 16:40

## 2024-01-27 NOTE — PLAN OF CARE
Goal Outcome Evaluation:  Plan of Care Reviewed With: patient  Patient Agreement with Plan of Care: agrees        Outcome Evaluation: Rated anxiety and depression both 0/10. Denied SI, HI, AVH. Disoriented to time and place.

## 2024-01-27 NOTE — PLAN OF CARE
Goal Outcome Evaluation:  Plan of Care Reviewed With: patient  Patient Agreement with Plan of Care: agrees     Progress: improving  Outcome Evaluation: pt.  rates anxiety /depression 5 perplexed manner denies s/i denies h/i pt. guarded manner he verbalizes noises makes him feel better deneis a/v h he verbalzies I'M NOT TALKING TO ANYBODY RIGHT NOW HE AMBULATES ABOUT UNIT UP TO NURSES DESK FREQUENTLY .PT. WATCHING T.V @ TIMES MINIMAL INTERACTION NOTED .

## 2024-01-27 NOTE — NURSING NOTE
Patient participated in the exercise group in gym, engaging in several activities interacting with peers appropriately.  Pt tolerated well.

## 2024-01-27 NOTE — PROGRESS NOTES
" Inpatient Psych Progress Note     Clinician: Maximino Bragg MD  Admission Date: 1/24/2024  09:11 EST 01/27/24    Behavioral Health Treatment Plan and Problem List: I have reviewed and approved the Behavioral Health Treatment Plan and Problem list.    Allergies  No Known Allergies    Hospital Day: 3 days      Assessment completed within view of staff    History  CC/clinical focus: psychosis/substance abuse    Interval HPI: Patient seen and evaluated by me.  Chart reviewed.  Patient did not voice delusional thoughts during the interview with me today, a bit guarded. He is tolerating medications okay.  Denies side effects.  Med Compliant.    Review of Systems   Constitutional: Negative.    HENT: Negative.     Eyes: Negative.    Respiratory: Negative.     Cardiovascular: Negative.    Gastrointestinal: Negative.    Endocrine: Negative.    Genitourinary: Negative.    Musculoskeletal: Negative.    Skin: Negative.    Allergic/Immunologic: Negative.    Neurological: Negative.    Hematological: Negative.    All other systems reviewed and are negative.       /72 (BP Location: Right arm, Patient Position: Sitting)   Pulse 58   Temp 96.7 °F (35.9 °C) (Temporal)   Resp 18   Ht 177.8 cm (70\")   Wt 98.2 kg (216 lb 9.6 oz)   SpO2 100%   BMI 31.08 kg/m²     Mental Status Exam  Mood: dysphoric  Affect: mood-congruent   Thought Processes: linear  Thought Content: guarded  Hallucinations: no  Suicidal Thoughts: denies  Suicidal Plan/Intent: denies  Hopelesness:Mild  Homicidal Thoughts:  denies      Medical Decision Making:   Labs:     Lab Results (last 24 hours)       ** No results found for the last 24 hours. **              Radiology:     Imaging Results (Last 24 Hours)       ** No results found for the last 24 hours. **              EKG:     ECG/EMG Results (most recent)       Procedure Component Value Units Date/Time    ECG 12 Lead Other; Baseline Cardiac Status [[id number]] Collected: 01/25/24 0230     Updated: " 01/25/24 1117     QT Interval 418 ms      QTC Interval 441 ms     Narrative:      Test Reason : Other~  Blood Pressure :   */*   mmHG  Vent. Rate :  67 BPM     Atrial Rate :  67 BPM     P-R Int : 140 ms          QRS Dur :  84 ms      QT Int : 418 ms       P-R-T Axes :  67  59  43 degrees     QTc Int : 441 ms    Normal sinus rhythm  Normal ECG  When compared with ECG of 24-JAN-2024 23:19, (Unconfirmed)  No significant change was found  Confirmed by Jose Armando Meneses ([id number]) on 1/25/2024 11:14:48 AM    Referred By: LAMONT           Confirmed By: Jose Armando Meneses             Medications:  metoprolol tartrate, 50 mg, Oral, BID  nicotine, 1 patch, Transdermal, Q24H  OLANZapine, 5 mg, Oral, BID           All medications reviewed.      Assessment and Plan:    Psychosis  Uncertain of the duration or what component might be related to substance abuse. Will continue to treat with Zyprexa and provide for supportive psychotherapeutic effort individual group.  Certainly could utilize additional historical information.       Polysubstance abuse  Uncertain of the details of his past substance abuse history.          Opioid dependence on agonist therapy  Reportedly had been started on Suboxone at the [facility] residential program in [facility] for the 2 weeks PTA.  Has  received IM Brixadi in the past, pharmacy to determine when.          Continue hospitalization for safety and stabilization.  Continue current level of Special Precautions (q15 minute checks).    I, Maximino Bragg MD, I have completed an encounter with the patient today, provided ongoing monitoring and/or adjustments to the assessment and plan described and documented above, and believe this information to be both accurate and complete as of 1/27/2024

## 2024-01-28 PROCEDURE — 99232 SBSQ HOSP IP/OBS MODERATE 35: CPT | Performed by: PSYCHIATRY & NEUROLOGY

## 2024-01-28 RX ADMIN — METOPROLOL TARTRATE 50 MG: 25 TABLET, FILM COATED ORAL at 08:26

## 2024-01-28 RX ADMIN — OLANZAPINE 5 MG: 5 TABLET, FILM COATED ORAL at 08:26

## 2024-01-28 RX ADMIN — NICOTINE TRANSDERMAL SYSTEM 1 PATCH: 21 PATCH, EXTENDED RELEASE TRANSDERMAL at 08:26

## 2024-01-28 RX ADMIN — OLANZAPINE 5 MG: 5 TABLET, FILM COATED ORAL at 20:00

## 2024-01-28 RX ADMIN — METOPROLOL TARTRATE 50 MG: 25 TABLET, FILM COATED ORAL at 20:00

## 2024-01-28 NOTE — PLAN OF CARE
Goal Outcome Evaluation:  Plan of Care Reviewed With: patient  Patient Agreement with Plan of Care: agrees     Progress: no change  Outcome Evaluation: Pt rates depression 4/10. Pt denies anxiety, SI/HI/AVH. Pt reports eating and sleeping well.

## 2024-01-28 NOTE — PLAN OF CARE
Problem: Adult Behavioral Health Plan of Care  Goal: Plan of Care Review  Outcome: Ongoing, Progressing  Flowsheets  Taken 1/28/2024 1304  Progress: improving  Outcome Evaluation: PATIENT DENIES ANY PROBLEMS THIS SHIFT. PATIENT IS AOX3, COOPERATIVE WITH NO S/S OF ACUTE DISTRESS NOTED. PATIENT IS EATING, DRINKING AND TAKING MEDICATIONS. NNO NOTED  Taken 1/28/2024 0751  Plan of Care Reviewed With: patient  Patient Agreement with Plan of Care: agrees   Goal Outcome Evaluation:  Plan of Care Reviewed With: patient  Patient Agreement with Plan of Care: agrees     Progress: improving  Outcome Evaluation: PATIENT DENIES ANY PROBLEMS THIS SHIFT. PATIENT IS AOX3, COOPERATIVE WITH NO S/S OF ACUTE DISTRESS NOTED. PATIENT IS EATING, DRINKING AND TAKING MEDICATIONS. NNO NOTED

## 2024-01-29 PROCEDURE — 99232 SBSQ HOSP IP/OBS MODERATE 35: CPT | Performed by: PSYCHIATRY & NEUROLOGY

## 2024-01-29 RX ADMIN — OLANZAPINE 5 MG: 5 TABLET, FILM COATED ORAL at 08:20

## 2024-01-29 RX ADMIN — OLANZAPINE 5 MG: 5 TABLET, FILM COATED ORAL at 20:31

## 2024-01-29 RX ADMIN — METOPROLOL TARTRATE 50 MG: 25 TABLET, FILM COATED ORAL at 08:20

## 2024-01-29 RX ADMIN — ACETAMINOPHEN 650 MG: 325 TABLET ORAL at 20:30

## 2024-01-29 RX ADMIN — METOPROLOL TARTRATE 50 MG: 25 TABLET, FILM COATED ORAL at 20:30

## 2024-01-29 RX ADMIN — NICOTINE TRANSDERMAL SYSTEM 1 PATCH: 21 PATCH, EXTENDED RELEASE TRANSDERMAL at 08:20

## 2024-01-29 RX ADMIN — TRAZODONE HYDROCHLORIDE 50 MG: 50 TABLET ORAL at 20:30

## 2024-01-29 NOTE — PROGRESS NOTES
"INPATIENT PSYCHIATRIC PROGRESS NOTE    Name:  Kenrick Soto  :  1990  MRN:  [id number]  Visit Number:  [id number]  Length of stay:  5    Behavioral Health Treatment Plan and Problem List: I have reviewed and approved the Behavioral Health Treatment Plan and Problem list.    SUBJECTIVE    CC/ Focus of exam:  psychosis/ substance abuse    Patient's subjective status: \"doing ok\"    INTERVAL HISTORY: The patient affect remain restricted/blunted but says he is not depressed, no hallucination and no demonstrated paranoia.  Patient was under the impression that [facility] could [facility] but that does not seem to be the case so we need to work on a safe disposition plan.  Patient states he might be able to go to his mother's home if not to a different residential program.    Depression rating 0/10  Anxiety rating 0/10  Hopelessness: 0/10  Sleep: Slept well  Withdrawal sx: None  Craving: Says not       ROS: No cardiovascular, GI, or Neurological complaints.       OBJECTIVE    Vitals:    24 0805   BP: 121/71   Pulse: 54   Resp: 18   Temp: 96.6 °F (35.9 °C)   SpO2: 98%      Temp:  [96.4 °F (35.8 °C)-96.6 °F (35.9 °C)] 96.6 °F (35.9 °C)  Heart Rate:  [54-64] 54  Resp:  [18] 18  BP: (121-126)/(71) 121/71    MENTAL STATUS EXAM:       Psychomotor: No psychomotor agitation/retardation, No EPS, No motor tics  Speech-normal rate, amount.  Mood/Affect: Blunted  Thought Processes: coherent  Thought Content:  No demonstrated formal thought disorder  Hallucination(s): none  Hopelessness: No  Optimistic: minimally  Suicidal Thoughts: No  Suicidal Plan/Intent: No  Homicidal Thoughts: absent  Orientation: Place and Person  Memory: recent intact    Lab Results (last 24 hours)       ** No results found for the last 24 hours. **             Imaging Results (Last 24 Hours)       ** No results found for the last 24 hours. **             Lab and x-ray studies reviewed.    ECG/EMG Results (most " recent)       Procedure Component Value Units Date/Time    ECG 12 Lead Other; Baseline Cardiac Status [[id number]] Collected: 01/25/24 0230     Updated: 01/25/24 1117     QT Interval 418 ms      QTC Interval 441 ms     Narrative:      Test Reason : Other~  Blood Pressure :   */*   mmHG  Vent. Rate :  67 BPM     Atrial Rate :  67 BPM     P-R Int : 140 ms          QRS Dur :  84 ms      QT Int : 418 ms       P-R-T Axes :  67  59  43 degrees     QTc Int : 441 ms    Normal sinus rhythm  Normal ECG  When compared with ECG of 24-JAN-2024 23:19, (Unconfirmed)  No significant change was found  Confirmed by Jose Armando Meneses ([id number]) on 1/25/2024 11:14:48 AM    Referred By: LAMONT           Confirmed By: Jose Armando Meneses             ALLERGIES: Patient has no known allergies.    Medications:     metoprolol tartrate, 50 mg, Oral, BID  nicotine, 1 patch, Transdermal, Q24H  OLANZapine, 5 mg, Oral, BID       ASSESSMENT & PLAN     Psychosis  Uncertain of the duration or what component might be related to substance abuse. Will continue to treat with Zyprexa and provide for supportive psychotherapeutic effort individual group.  Certainly could utilize additional historical information.     Invega Sustenna 117 1/5/2024   Brixadi 64 mg  1/12/2024.   Per [facility].       Polysubstance abuse  Uncertain of the details of his past substance abuse history.      Special precautions: Special Precautions Level 3 (q15 min checks)     Behavioral Health Treatment Plan and Problem List: I have reviewed and approved the Behavioral Health Treatment Plan and Problem list.    I spent a total of 26 minutes in direct patient care including  17 minutes face to face with the patient assessment, coordination of care, and counseling the patient on the current and follow-up treatment plans regarding his status and situation.  Patient had no additional questions.     NGOC Bragg MD    Clinician:  Jesse Bragg MD  01/29/24  10:25 EST    Dictated  utilizing Dragon dictation

## 2024-01-29 NOTE — PLAN OF CARE
Goal Outcome Evaluation:  Plan of Care Reviewed With: patient  Patient Agreement with Plan of Care: agrees     Progress: improving  Outcome Evaluation: Patient was calm and cooperative during assessment. He reported that appetite was ‘ok’ and he was sleeping well. He rated his anxiety as 5/10 and his depression as 0/10. He denied SI/HI/AVH. He spent most of the shift in the dayroom watching television and interacting with his peers.

## 2024-01-29 NOTE — DISCHARGE INSTR - APPOINTMENTS
[facility]   [phone]   [address] [place] [address]    February 1 2024 at 9:00am

## 2024-01-29 NOTE — PLAN OF CARE
Problem: Adult Behavioral Health Plan of Care  Goal: Plan of Care Review  Outcome: Ongoing, Progressing  Flowsheets  Taken 1/29/2024 1328 by Leroy Pete, RN  Outcome Evaluation: PATIENT DENIES ANY PROBLEMS THIS SHIFT. PATIENT IS AOX3, COOPERATIVE WITH NO S/S OF ACUTE DISTRESS NOTED. PATIENT C/O BLOOD IN STOOL, MD IS AWARE. LABWORK ORDERED AND MD IS AWARE OF THE RESULTS.  Taken 1/29/2024 0710 by Leroy Pete, RN  Plan of Care Reviewed With: patient  Patient Agreement with Plan of Care: agrees  Taken 1/28/2024 2327 by Thea Cai, RN  Progress: improving   Goal Outcome Evaluation:  Plan of Care Reviewed With: patient  Patient Agreement with Plan of Care: agrees     Progress: improving  Outcome Evaluation: PATIENT DENIES ANY PROBLEMS THIS SHIFT. PATIENT IS AOX3, COOPERATIVE WITH NO S/S OF ACUTE DISTRESS NOTED. PATIENT C/O BLOOD IN STOOL, MD IS AWARE. LABWORK ORDERED AND MD IS AWARE OF THE RESULTS.

## 2024-01-29 NOTE — PLAN OF CARE
Problem: Adult Behavioral Health Plan of Care  Goal: Optimized Coping Skills in Response to Life Stressors  Outcome: Ongoing, Progressing  Intervention: Promote Effective Coping Strategies  Flowsheets (Taken 1/29/2024 1030)  Supportive Measures:   active listening utilized   counseling provided  Goal: Develops/Participates in Therapeutic [place] to Support Successful Transition  Outcome: Ongoing, Progressing  Intervention: Foster Therapeutic [place]  Flowsheets (Taken 1/29/2024 0710 by Leroy Pete, RN)  Trust Relationship/Rapport:   care explained   choices provided   emotional support provided   empathic listening provided   questions answered   questions encouraged   reassurance provided   thoughts/feelings acknowledged  Intervention: Mutually Develop Transition Plan  Flowsheets (Taken 1/29/2024 1030)  Transition Support:   community resources reviewed   follow-up care coordinated   follow-up care discussed   crisis management plan promoted   crisis management plan verbalized      1030    DATA:    Therapist met with the patient individually. Therapist continues reviewing plan of care and aftercare plan.  The patient was agreeable.    ASSESSMENT:    Patient was seen for follow up of  Psychosis. Polysubstance abuse. Opioid dependence on agonist therapy     Today, patient was seen 1-1 in the office. Patient calm, cooperative, oriented x 3.  Patient noted to have restricted affect, congruent mood, coherent thought content.  Patient denies SI/HI/AVH and rates depression/anxiety at 5/10.  Patient is more coherent today and reports that if he does not return to [facility] he can go home with his sister and mother.  Patient was explained that due to his behaviors prior to admission he can not return to [facility] per contact with their staff.  Patient declines to refer to other houses and reports that he would like to go home.     Patient signed consent for his mother Jess  and sister Jannet. We were only  able to reach his sister Jannet. She reports that the patient can return to her house and that the house is safe guarded from firearms, weapons, and medications.  She reports that patient needs to stay on his psych medications and receive routine Invega RODARTE and does better when he has access to treatment and medicines. Patient reports belief that [facility] staff did not help him get his medicine.  Jannet identified that she can pick patient up tomorrow and help him  his belongings at [facility]. Patient agreeable.     Patient signed consent for [facility] and states that he is missing court today. He consents for therapist to alert the court via letter. Letter faxed to [phone] this date.      CLINICAL MANEUVERING:    Therapist provided safe, secure environment for patient to share.  Provided reflective listening and psychoeducation.  Assisted patient in processing the above session. Assisted patient in identifying any questions or needs to be addressed today.        Concern was voiced regarding substance use and educated patient on risks associated with use. Patient was advised to abstain from use and educated on community resources that can help with sobriety and recovery.  Patient declines to refer to other residential [facility] or sober living [facility]. He signed consent for [facility] this date.     Plan:     Patient to remain hospitalized this date.      Treatment team will focus efforts on stabilizing patient's acute symptoms while providing education on healthy coping and crisis management to reduce hospitalizations.   Patient requires daily psychiatrist evaluation and 24/7 nursing supervision to promote patient  safety.     Therapist will offer 1-4 individual sessions, family education, and appropriate referral.     Therapist recommends continued assessment.  Patient signed consent for his sister Jannet and will return home with her. Jannet to transport.  He signed consent for  [facility].   Patient has signed consent for [facility]; navigator please contact [facility] and alert them that patient and his sister will come by tomorrow to bring up his stuff.

## 2024-01-29 NOTE — PLAN OF CARE
Problem: Adult Behavioral Health Plan of Care  Goal: Plan of Care Review  1/29/2024 1330 by Leroy Pete, RN  Outcome: Ongoing, Progressing  Flowsheets  Taken 1/29/2024 1330 by Leroy Pete, RN  Outcome Evaluation: PATIENT DENIES ANY PROBLEMS THIS SHIFT. PATIENT IS AOX3, COOPERATIVE WITH NO S/S OF ACUTE DISTRESS NOTED. NEW ORDERS: A1C, FLP  Taken 1/29/2024 0710 by Leroy Pete, RN  Plan of Care Reviewed With: patient  Patient Agreement with Plan of Care: agrees  Taken 1/28/2024 2327 by Thea Cai RN  Progress: improving   Goal Outcome Evaluation:  Plan of Care Reviewed With: patient  Patient Agreement with Plan of Care: agrees     Progress: improving  Outcome Evaluation: PATIENT DENIES ANY PROBLEMS THIS SHIFT. PATIENT IS AOX3, COOPERATIVE WITH NO S/S OF ACUTE DISTRESS NOTED. NEW ORDERS: A1C, FLP                                [phone]

## 2024-01-30 VITALS
HEIGHT: 70 IN | TEMPERATURE: 96.4 F | SYSTOLIC BLOOD PRESSURE: 117 MMHG | OXYGEN SATURATION: 99 % | RESPIRATION RATE: 15 BRPM | DIASTOLIC BLOOD PRESSURE: 60 MMHG | WEIGHT: 216.6 LBS | BODY MASS INDEX: 31.01 KG/M2 | HEART RATE: 70 BPM

## 2024-01-30 PROBLEM — F20.5: Status: ACTIVE | Noted: 2024-01-30

## 2024-01-30 LAB
CHOLEST SERPL-MCNC: 134 MG/DL (ref 0–200)
HBA1C MFR BLD: 5.5 % (ref 4.8–5.6)
HDLC SERPL-MCNC: 60 MG/DL (ref 40–60)
LDLC SERPL CALC-MCNC: 54 MG/DL (ref 0–100)
LDLC/HDLC SERPL: 0.87 {RATIO}
TRIGL SERPL-MCNC: 109 MG/DL (ref 0–150)
VLDLC SERPL-MCNC: 20 MG/DL (ref 5–40)

## 2024-01-30 PROCEDURE — 80061 LIPID PANEL: CPT | Performed by: PSYCHIATRY & NEUROLOGY

## 2024-01-30 PROCEDURE — 99239 HOSP IP/OBS DSCHRG MGMT >30: CPT | Performed by: PSYCHIATRY & NEUROLOGY

## 2024-01-30 PROCEDURE — 83036 HEMOGLOBIN GLYCOSYLATED A1C: CPT | Performed by: PSYCHIATRY & NEUROLOGY

## 2024-01-30 RX ORDER — OLANZAPINE 10 MG/1
10 TABLET ORAL NIGHTLY
Qty: 30 TABLET | Refills: 0 | Status: SHIPPED | OUTPATIENT
Start: 2024-01-30 | End: 2025-01-29

## 2024-01-30 RX ORDER — METOPROLOL TARTRATE 50 MG/1
50 TABLET, FILM COATED ORAL 2 TIMES DAILY
Qty: 60 TABLET | Refills: 0 | Status: SHIPPED | OUTPATIENT
Start: 2024-01-30

## 2024-01-30 RX ADMIN — OLANZAPINE 5 MG: 5 TABLET, FILM COATED ORAL at 08:17

## 2024-01-30 RX ADMIN — NICOTINE TRANSDERMAL SYSTEM 1 PATCH: 21 PATCH, EXTENDED RELEASE TRANSDERMAL at 08:18

## 2024-01-30 RX ADMIN — ACETAMINOPHEN 650 MG: 325 TABLET ORAL at 08:20

## 2024-01-30 RX ADMIN — METOPROLOL TARTRATE 50 MG: 25 TABLET, FILM COATED ORAL at 08:17

## 2024-01-30 NOTE — DISCHARGE SUMMARY
Date of Discharge:  1/30/2024    Discharge Diagnosis:Principal Problem:    Psychosis  Active Problems:    Polysubstance abuse    Opioid dependence on agonist therapy        Presenting Problem/History of Present Illness:Patient was admitted to the hospital on January 24 having presented psychotic, voluntarily admitted for safety evaluation and treatment, see admission note for details.         Hospital Course:      Patient was admitted for safety and stabilization and was placed on standard precautions.  Routine labs were checked.  Patient was assigned a master's level therapist and provided with an opportunity to participate in group and individual therapy on the unit.  Patient seen on a daily basis for evaluation and supportive therapy.  Patient's psychosis with his tangential thinking and auditory hallucinations along with the trace paranoia cleared gradually during his hospital stay.  Residual low-grade noncommanding auditory hallucinations apparent at discharge.  It is likely the patient has a primary psychiatric diagnosis of schizophrenia as opposed to his psychosis being totally secondary to abuse of methamphetamine. Patient was denying any thoughts of harming self or others and his affect was  appropriate.  Psychotropic medication limited to Zyprexa 10 mg daily noting with the patient had received PTA: Invega Sustenna 117 1/5/2024 and Brixadi 64 mg  1/12/2024 Per [facility].  Patient was to be living with his sister at least temporarily having declined recommendations for returning to a residential chemical dependency program.  Patient was voicing insights into the negative if not dangerous adversities associated with drug use.  See below for details of medication and follow-up.  It is recommended patient continuing with the Depo Invega sustenna as well as the Brixadi.                Consults:   Consults       No orders found from 12/26/2023 to 1/25/2024.            Labs:  Lab Results (all)        Procedure Component Value Units Date/Time    Lipid Panel [[id number]] Collected: 01/30/24 0651    Specimen: Blood Updated: 01/30/24 0804     Total Cholesterol 134 mg/dL      Triglycerides 109 mg/dL      HDL Cholesterol 60 mg/dL      LDL Cholesterol  54 mg/dL      VLDL Cholesterol 20 mg/dL      LDL/HDL Ratio 0.87    Narrative:      Cholesterol Reference Ranges  (U.S. Department of Health and Human Services ATP III Classifications)    Desirable          <200 mg/dL  Borderline High    200-239 mg/dL  High Risk          >240 mg/dL      Triglyceride Reference Ranges  (U.S. Department of Health and Human Services ATP III Classifications)    Normal           <150 mg/dL  Borderline High  150-199 mg/dL  High             200-499 mg/dL  Very High        >500 mg/dL    HDL Reference Ranges  (U.S. Department of Health and Human Services ATP III Classifications)    Low     <40 mg/dl (major risk factor for CHD)  High    >60 mg/dl ('negative' risk factor for CHD)        LDL Reference Ranges  (U.S. Department of Health and Human Services ATP III Classifications)    Optimal          <100 mg/dL  Near Optimal     100-129 mg/dL  Borderline High  130-159 mg/dL  High             160-189 mg/dL  Very High        >189 mg/dL    Hemoglobin A1c [[id number]]  (Normal) Collected: 01/30/24 0651    Specimen: Blood Updated: 01/30/24 0742     Hemoglobin A1C 5.50 %     Narrative:      Hemoglobin A1C Ranges:    Increased Risk for Diabetes  5.7% to 6.4%  Diabetes                     >= 6.5%  Diabetic Goal                < 7.0%    Hepatitis Panel, Acute [[id number]]  (Abnormal) Collected: 01/25/24 0623    Specimen: Blood Updated: 01/25/24 0759     Hepatitis B Surface Ag Non-Reactive     Hep A IgM Non-Reactive     Hep B C IgM Non-Reactive     Hepatitis C Ab Reactive    Narrative:      Results may be falsely decreased if patient taking Biotin.             Imaging:  Imaging Results (All)       None            Condition on Discharge:  improved    Prognosis:  Marginal    Vital Signs  Temp:  [96.4 °F (35.8 °C)-96.8 °F (36 °C)] 96.4 °F (35.8 °C)  Heart Rate:  [63-70] 70  Resp:  [15-18] 15  BP: (117-122)/(60-66) 117/60    Discharge Disposition  Home or Self Care       Discharge Medications        New Medications        Instructions Start Date   OLANZapine 10 MG tablet  Commonly known as: ZyPREXA   10 mg, Oral, Nightly             Continue These Medications        Instructions Start Date   Brixadi 64 MG/0.18ML solution prefilled syringe  Generic drug: Buprenorphine ER   64 mg, Subcutaneous, Every 28 Days      Invega Sustenna 117 MG/0.75ML suspension prefilled syringe IM injection  Generic drug: paliperidone palmitate   117 mg, Intramuscular, Every 30 Days      metoprolol tartrate 50 MG tablet  Commonly known as: LOPRESSOR   50 mg, Oral, 2 Times Daily             Stop These Medications      buprenorphine-naloxone 8-2 MG per SL tablet  Commonly known as: SUBOXONE     mirtazapine 7.5 MG tablet  Commonly known as: REMERON     ondansetron ODT 4 MG disintegrating tablet  Commonly known as: ZOFRAN-ODT              Discharge Diet: Regular    Activity at Discharge: No restrictions    Follow-up Appointments:    [facility]   [phone]    [address] [place] [address]     February 1 2024 at 9:00am           Jesse Bragg MD  01/30/24  09:14 EST  .    Time: I spent greater than 30 minutes on this discharge activity which included: face-to-face encounter with the patient, reviewing the data in the system, coordination of the care with the nursing staff as well as consultants, documentation, and entering orders.      Dictated utilizing Dragon dictation

## 2024-01-30 NOTE — PROGRESS NOTES
[id number]    Therapist met 1-1 in the office. Patient calm/cooperative, oriented x 4.  Patient noted to have appropriate affect, congruent mood, normal thought content. Patient denies SI/HI/AVH and acute symptoms.  Patient rates depression at 5/10 and anxiety at 1/10. Patient is medication compliant and reports good sleep and appetite. He does not demonstrate any symptoms of psychosis or delusion during session.  Patient is future oriented;  he reports that he wants to go back home and change people, places and things.     Patient has been scheduled with [facility]; he was educated about the importance of compliance with therapy, medication.  Patient agreeable.  Patient has been introduced to CBT and DBT concepts in group and receptive.     Concern was voiced regarding substance use and educated patient on risks associated with use. Patient was advised to abstain from use and educated on community resources that can help with sobriety and recovery.  Patient agreeable to abstain.      Patient has involved his sister Jannet in treatment at [phone]; she was agreeable to patient returning to her home today.  Home is identified to be safe guarded from firearms, weapons, and medications.  Patient's were recommended to be locked up and administered to patient. Jannet and patient agreeable.     Assisted patient in identifying risk factors which would indicate the need for higher level of care including thoughts to harm self or others and/or self-harming behavior and encouraged patient to call [phone], call 911, or present to the nearest emergency room should any of these events occur. Discussed crisis intervention services and means to access.  Patient adamantly and convincingly denies current suicidal or homicidal ideation or perceptual disturbance.    Therapist completed the following safety plan with the patient       SAFETY/MENTAL HEALTH PLAN    1. Recognizing warning signs: Warning signs that a crisis may be developing  such as thoughts, images, mood, situation, behaviors:       Seeing people, suicidal thoughts    2. Internal coping strategies: Things that the patient can do to take their mind off problems without contacting another person such as relaxation techniques, physical activity, etc:     Journaling. It really works.  Getting on the internet. I would like to get a job     3. Socialization strategies for distraction and support: People and social settings that provide distraction or support - names or places, telephone:      Sister Jannet and mom Jess.  Just communicating, talking about our thoughts and hanging out. Being good friends and supportive of each other.       4. Social contact for assistance in resolving crisis: People the patient can ask for help - names and telephone:       Friend Esau     5. Professionals or agencies contacts to help resolve crisis: Professionals or agencies the patient can contact during a crisis - clinician name/location/phone/emergency contact number, local urgent care services with address/phone, National Suicide Prevention Lifeline (198), Emergency contact 911:      I'd have family bring me to nearest ER     6. Means restriction: Ways to make the environment safe     No access to firearms, weapons, medications.  Patient's sister/mother to keep medicine and administer.

## 2024-01-30 NOTE — PAYOR COMM NOTE
"Mary Beth Overton (33 y.o. Male)       Date of Birth   1990    Social Security Number       Address   [address] [place] [id number]    Home Phone   [phone]    MRN   [id number]       Baptism   None    Marital Status                               Admission Date   1/24/24    Admission Type   Emergency    Admitting Provider   Maximino Bragg MD    Attending Provider       Department, Room/Bed   [facility] ADULT PSYCHIATRIC, 1020/1S       Discharge Date   1/30/2024    Discharge Disposition   Home or Self Care    Discharge Destination                                 Attending Provider: (none)   Allergies: No Known Allergies    Isolation: None   Infection: None   Code Status: CPR    Ht: 177.8 cm (70\")   Wt: 98.2 kg (216 lb 9.6 oz)    Admission Cmt: None   Principal Problem: Psychosis [F29]                   Active Insurance as of 1/24/2024       Primary Coverage       Payor Plan Insurance Group Employer/Plan Group    ANTHEM MEDICAID AN[employer]M MEDICAID [id number]       Payor Plan Address Payor Plan Phone Number Payor Plan Fax Number Effective Dates    [address] [address] [phone]  12/14/2023 - None Entered    [facility] [id number]         Subscriber Name Subscriber Birth Date Member ID       MARY BETH OVERTON 1990 [id number]                     Emergency Contacts            No emergency contacts on file.          PLEASE ATTACH THIS DISCHARGE INFORMATION TO AUTH/REFERENCE # [id number]     DISCHARGE DATE:  01/30/2024    Discharge Diagnosis:Principal Problem:    Psychosis (F29)  Active Problems:    Polysubstance abuse (F19.10)    Opioid dependence on agonist therapy (F11.20)    Psychosis ICD-10-CM: F29  ICD-9-CM: 298.9   1/24/2024 - Present   RESOLVED: Psychosis ICD-10-CM: F29  ICD-9-CM: 298.9   1/25/2024 - 1/25/2024   Polysubstance abuse ICD-10-CM: F19.10  ICD-9-CM: 305.90   1/25/2024 - Present   Opioid dependence on agonist therapy ICD-10-CM: F11.20  ICD-9-CM: 304.00   " "1/25/2024 - Present       FOLLOW UP:  [facility]   [phone]    [address] [place] [address]     February 1 2024 at 9:00am    PER THERAPY NOTE 01/29/2024: \"Patient declines to refer to other residential [facility] or sober living houses. He signed consent for [facility] this date. \"    Medication List  Medication List    Morning Afternoon Evening Bedtime As Needed    Brixadi 64 MG/0.18ML solution prefilled syringe  Inject 64 mg under the skin into the appropriate area as directed Every 28 (Twenty-Eight) Days.  Generic drug: Buprenorphine ER  For: Opioid Dependence  Last time this was given: Due 2/9/24 EVERY 28 DAYS        Invega Sustenna 117 MG/0.75ML suspension prefilled syringe IM injection  Inject 0.75 mL into the appropriate muscle as directed by prescriber Every 30 (Thirty) Days.  Generic drug: paliperidone palmitate  For: Schizophrenia  Last time this was given: Due 2/2/2024 EVERY 30 DAYS        metoprolol tartrate 50 MG tablet  Commonly known as: LOPRESSOR  Take 1 tablet by mouth 2 (Two) Times a Day.  For: High Blood Pressure Disorder  Last time this was given: 50 mg on January 30, 2024  8:17 AM  Signed by: Jesse Bragg         OLANZapine 10 MG tablet  Commonly known as: ZyPREXA  Take 1 tablet by mouth Every Night.  For: Schizophrenia  Last time this was given: 5 mg on January 30, 2024  8:17 AM  Signed by: [place] Michelle   Therapist  Psychiatry     Progress Notes      Signed     Date of Service: 01/30/24 0943  Creation Time: 01/30/24 0943     Signed         0940     Therapist met 1-1 in the office. Patient calm/cooperative, oriented x 4.  Patient noted to have appropriate affect, congruent mood, normal thought content. Patient denies SI/HI/AVH and acute symptoms.  Patient rates depression at 5/10 and anxiety at 1/10. Patient is medication compliant and reports good sleep and appetite. He does not demonstrate any symptoms of " psychosis or delusion during session.  Patient is future oriented;  he reports that he wants to go back home and change people, places and things.      Patient has been scheduled with [facility]; he was educated about the importance of compliance with therapy, medication.  Patient agreeable.  Patient has been introduced to CBT and DBT concepts in group and receptive.      Concern was voiced regarding substance use and educated patient on risks associated with use. Patient was advised to abstain from use and educated on community resources that can help with sobriety and recovery.  Patient agreeable to abstain.       Patient has involved his sister Jannet in treatment at [phone]; she was agreeable to patient returning to her home today.  Home is identified to be safe guarded from firearms, weapons, and medications.  Patient's were recommended to be locked up and administered to patient. Jannet and patient agreeable.      Assisted patient in identifying risk factors which would indicate the need for higher level of care including thoughts to harm self or others and/or self-harming behavior and encouraged patient to call [phone], call 911, or present to the nearest emergency room should any of these events occur. Discussed crisis intervention services and means to access.  Patient adamantly and convincingly denies current suicidal or homicidal ideation or perceptual disturbance.     Therapist completed the following safety plan with the patient         SAFETY/MENTAL HEALTH PLAN     1. Recognizing warning signs: Warning signs that a crisis may be developing such as thoughts, images, mood, situation, behaviors:        Seeing people, suicidal thoughts     2. Internal coping strategies: Things that the patient can do to take their mind off problems without contacting another person such as relaxation techniques, physical activity, etc:      Journaling. It really works.  Getting on the internet. I would like to get a  job      3. Socialization strategies for distraction and support: People and social settings that provide distraction or support - names or places, telephone:      Sister Jannet and mom Jess.  Just communicating, talking about our thoughts and hanging out. Being good friends and supportive of each other.         4. Social contact for assistance in resolving crisis: People the patient can ask for help - names and telephone:        Friend Esau      5. Professionals or agencies contacts to help resolve crisis: Professionals or agencies the patient can contact during a crisis - clinician name/location/phone/emergency contact number, local urgent care services with address/phone, National Suicide Prevention Lifeline (986), Emergency contact 911:       I'd have family bring me to nearest ER      6. Means restriction: Ways to make the environment safe      No access to firearms, weapons, medications.  Patient's sister/mother to keep medicine and administer.                       Discharge Summary        Jesse Bragg MD at 01/30/24 0913            Date of Discharge:  1/30/2024    Discharge Diagnosis:Principal Problem:    Psychosis  Active Problems:    Polysubstance abuse    Opioid dependence on agonist therapy        Presenting Problem/History of Present Illness:Patient was admitted to the hospital on January 24 having presented psychotic, voluntarily admitted for safety evaluation and treatment, see admission note for details.         Hospital Course:      Patient was admitted for safety and stabilization and was placed on standard precautions.  Routine labs were checked.  Patient was assigned a master's level therapist and provided with an opportunity to participate in group and individual therapy on the unit.  Patient seen on a daily basis for evaluation and supportive therapy.  Patient's psychosis with his tangential thinking and auditory hallucinations along with the trace paranoia cleared gradually  during his hospital stay.  Residual low-grade noncommanding auditory hallucinations apparent at discharge.  It is likely the patient has a primary psychiatric diagnosis of schizophrenia as opposed to his psychosis being totally secondary to abuse of methamphetamine. Patient was denying any thoughts of harming self or others and his affect was  appropriate.  Psychotropic medication limited to Zyprexa 10 mg daily noting with the patient had received PTA: Invega Sustenna 117 1/5/2024 and Brixadi 64 mg  1/12/2024 Per [facility].  Patient was to be living with his sister at least temporarily having declined recommendations for returning to a residential chemical dependency program.  Patient was voicing insights into the negative if not dangerous adversities associated with drug use.  See below for details of medication and follow-up.  It is recommended patient continuing with the Depo Invega sustenna as well as the Brixadi.                Consults:   Consults       No orders found from 12/26/2023 to 1/25/2024.            Labs:  Lab Results (all)       Procedure Component Value Units Date/Time    Lipid Panel [[id number]] Collected: 01/30/24 0651    Specimen: Blood Updated: 01/30/24 0804     Total Cholesterol 134 mg/dL      Triglycerides 109 mg/dL      HDL Cholesterol 60 mg/dL      LDL Cholesterol  54 mg/dL      VLDL Cholesterol 20 mg/dL      LDL/HDL Ratio 0.87    Narrative:      Cholesterol Reference Ranges  (U.S. Department of Health and Human Services ATP III Classifications)    Desirable          <200 mg/dL  Borderline High    200-239 mg/dL  High Risk          >240 mg/dL      Triglyceride Reference Ranges  (U.S. Department of Health and Human Services ATP III Classifications)    Normal           <150 mg/dL  Borderline High  150-199 mg/dL  High             200-499 mg/dL  Very High        >500 mg/dL    HDL Reference Ranges  (U.S. Department of Health and Human Services ATP III Classifications)    Low     <40 mg/dl  (major risk factor for CHD)  High    >60 mg/dl ('negative' risk factor for CHD)        LDL Reference Ranges  (U.S. Department of Health and Human Services ATP III Classifications)    Optimal          <100 mg/dL  Near Optimal     100-129 mg/dL  Borderline High  130-159 mg/dL  High             160-189 mg/dL  Very High        >189 mg/dL    Hemoglobin A1c [[id number]]  (Normal) Collected: 01/30/24 0651    Specimen: Blood Updated: 01/30/24 0742     Hemoglobin A1C 5.50 %     Narrative:      Hemoglobin A1C Ranges:    Increased Risk for Diabetes  5.7% to 6.4%  Diabetes                     >= 6.5%  Diabetic Goal                < 7.0%    Hepatitis Panel, Acute [[id number]]  (Abnormal) Collected: 01/25/24 0623    Specimen: Blood Updated: 01/25/24 0759     Hepatitis B Surface Ag Non-Reactive     Hep A IgM Non-Reactive     Hep B C IgM Non-Reactive     Hepatitis C Ab Reactive    Narrative:      Results may be falsely decreased if patient taking Biotin.             Imaging:  Imaging Results (All)       None            Condition on Discharge:  improved    Prognosis: Marginal    Vital Signs  Temp:  [96.4 °F (35.8 °C)-96.8 °F (36 °C)] 96.4 °F (35.8 °C)  Heart Rate:  [63-70] 70  Resp:  [15-18] 15  BP: (117-122)/(60-66) 117/60    Discharge Disposition  Home or Self Care       Discharge Medications        New Medications        Instructions Start Date   OLANZapine 10 MG tablet  Commonly known as: ZyPREXA   10 mg, Oral, Nightly             Continue These Medications        Instructions Start Date   Brixadi 64 MG/0.18ML solution prefilled syringe  Generic drug: Buprenorphine ER   64 mg, Subcutaneous, Every 28 Days      Invega Sustenna 117 MG/0.75ML suspension prefilled syringe IM injection  Generic drug: paliperidone palmitate   117 mg, Intramuscular, Every 30 Days      metoprolol tartrate 50 MG tablet  Commonly known as: LOPRESSOR   50 mg, Oral, 2 Times Daily             Stop These Medications      buprenorphine-naloxone 8-2 MG per SL  tablet  Commonly known as: SUBOXONE     mirtazapine 7.5 MG tablet  Commonly known as: REMERON     ondansetron ODT 4 MG disintegrating tablet  Commonly known as: ZOFRAN-ODT              Discharge Diet: Regular    Activity at Discharge: No restrictions    Follow-up Appointments:    [facility]   [phone]    [address] [place], [place] [id number]     February 1 2024 at 9:00am           Jesse Bragg MD  01/30/24  09:14 EST  .    Time: I spent greater than 30 minutes on this discharge activity which included: face-to-face encounter with the patient, reviewing the data in the system, coordination of the care with the nursing staff as well as consultants, documentation, and entering orders.      Dictated utilizing Dragon dictation    Electronically signed by Jesse Bragg MD at 01/30/24 2971